# Patient Record
Sex: FEMALE | Race: WHITE | Employment: FULL TIME | URBAN - METROPOLITAN AREA
[De-identification: names, ages, dates, MRNs, and addresses within clinical notes are randomized per-mention and may not be internally consistent; named-entity substitution may affect disease eponyms.]

---

## 2017-01-10 ENCOUNTER — OFFICE VISIT (OUTPATIENT)
Dept: GASTROENTEROLOGY | Age: 62
End: 2017-01-10

## 2017-01-10 ENCOUNTER — TELEPHONE (OUTPATIENT)
Dept: GASTROENTEROLOGY | Age: 62
End: 2017-01-10

## 2017-01-10 VITALS
OXYGEN SATURATION: 97 % | SYSTOLIC BLOOD PRESSURE: 122 MMHG | HEIGHT: 64 IN | DIASTOLIC BLOOD PRESSURE: 86 MMHG | HEART RATE: 65 BPM | WEIGHT: 204 LBS | BODY MASS INDEX: 34.83 KG/M2

## 2017-01-10 DIAGNOSIS — K59.00 CONSTIPATION, UNSPECIFIED CONSTIPATION TYPE: ICD-10-CM

## 2017-01-10 DIAGNOSIS — R11.0 NAUSEA: ICD-10-CM

## 2017-01-10 DIAGNOSIS — R79.89 ELEVATED LFTS: ICD-10-CM

## 2017-01-10 DIAGNOSIS — K74.3 PRIMARY BILIARY CIRRHOSIS (HCC): Primary | ICD-10-CM

## 2017-01-10 DIAGNOSIS — R13.14 PHARYNGOESOPHAGEAL DYSPHAGIA: ICD-10-CM

## 2017-01-10 PROCEDURE — 99213 OFFICE O/P EST LOW 20 MIN: CPT | Performed by: INTERNAL MEDICINE

## 2017-01-10 ASSESSMENT — ENCOUNTER SYMPTOMS
RESPIRATORY NEGATIVE: 1
CONSTIPATION: 1
EYES NEGATIVE: 1
ALLERGIC/IMMUNOLOGIC NEGATIVE: 1
NAUSEA: 1

## 2017-03-22 ENCOUNTER — TELEPHONE (OUTPATIENT)
Dept: GASTROENTEROLOGY | Age: 62
End: 2017-03-22

## 2017-04-12 ENCOUNTER — TELEPHONE (OUTPATIENT)
Dept: GASTROENTEROLOGY | Age: 62
End: 2017-04-12

## 2017-04-12 ENCOUNTER — OFFICE VISIT (OUTPATIENT)
Dept: GASTROENTEROLOGY | Age: 62
End: 2017-04-12

## 2017-04-12 VITALS
WEIGHT: 199 LBS | BODY MASS INDEX: 35.26 KG/M2 | DIASTOLIC BLOOD PRESSURE: 86 MMHG | SYSTOLIC BLOOD PRESSURE: 118 MMHG | HEART RATE: 65 BPM | OXYGEN SATURATION: 96 % | HEIGHT: 63 IN

## 2017-04-12 DIAGNOSIS — R11.0 NAUSEA: ICD-10-CM

## 2017-04-12 DIAGNOSIS — R13.14 PHARYNGOESOPHAGEAL DYSPHAGIA: ICD-10-CM

## 2017-04-12 DIAGNOSIS — R79.89 ELEVATED LFTS: ICD-10-CM

## 2017-04-12 DIAGNOSIS — K74.3 PRIMARY BILIARY CIRRHOSIS (HCC): Primary | ICD-10-CM

## 2017-04-12 DIAGNOSIS — R19.7 DIARRHEA, UNSPECIFIED TYPE: ICD-10-CM

## 2017-04-12 PROCEDURE — 99213 OFFICE O/P EST LOW 20 MIN: CPT | Performed by: INTERNAL MEDICINE

## 2017-04-12 RX ORDER — ERGOCALCIFEROL 1.25 MG/1
CAPSULE ORAL
Refills: 3 | COMMUNITY
Start: 2017-04-04 | End: 2017-11-15

## 2017-04-12 ASSESSMENT — ENCOUNTER SYMPTOMS
ALLERGIC/IMMUNOLOGIC NEGATIVE: 1
CONSTIPATION: 1
RESPIRATORY NEGATIVE: 1
EYES NEGATIVE: 1
DIARRHEA: 1
NAUSEA: 1

## 2017-04-21 ENCOUNTER — HOSPITAL ENCOUNTER (OUTPATIENT)
Dept: GENERAL RADIOLOGY | Age: 62
Discharge: HOME | End: 2017-04-21
Attending: INTERNAL MEDICINE | Admitting: INTERNAL MEDICINE

## 2017-04-21 LAB
ALBUMIN SERPL-MCNC: 4.4 GM/DL (ref 3.4–5)
ALP BLD-CCNC: 193 IU/L (ref 40–129)
ALT SERPL-CCNC: 44 U/L (ref 10–40)
AST SERPL-CCNC: 48 IU/L (ref 15–37)
BILIRUB SERPL-MCNC: 1 MG/DL (ref 0–1)
BILIRUBIN DIRECT: 0.2 MG/DL (ref 0–0.3)
BILIRUBIN, INDIRECT: 0.8 MG/DL (ref 0–0.7)
TOTAL PROTEIN: 7.1 GM/DL (ref 6.4–8.2)

## 2017-04-25 DIAGNOSIS — K74.5 BILIARY CIRRHOSIS (HCC): ICD-10-CM

## 2017-04-25 DIAGNOSIS — R79.89 ELEVATED LFTS: Primary | ICD-10-CM

## 2017-05-10 ENCOUNTER — HOSPITAL ENCOUNTER (OUTPATIENT)
Dept: GENERAL RADIOLOGY | Age: 62
Discharge: HOME | End: 2017-05-10
Attending: INTERNAL MEDICINE | Admitting: INTERNAL MEDICINE

## 2017-05-10 ENCOUNTER — HOSPITAL ENCOUNTER (OUTPATIENT)
Dept: MRI IMAGING | Age: 62
Discharge: HOME | End: 2017-05-10

## 2017-05-10 DIAGNOSIS — G43.909 MIGRAINE WITHOUT STATUS MIGRAINOSUS, NOT INTRACTABLE: ICD-10-CM

## 2017-05-10 DIAGNOSIS — G43.809 OTHER MIGRAINE WITHOUT STATUS MIGRAINOSUS, NOT INTRACTABLE: ICD-10-CM

## 2017-05-10 LAB
ALBUMIN SERPL-MCNC: 4.3 GM/DL (ref 3.4–5)
ALP BLD-CCNC: 186 IU/L (ref 40–129)
ALT SERPL-CCNC: 21 U/L (ref 10–40)
AST SERPL-CCNC: 23 IU/L (ref 15–37)
BILIRUB SERPL-MCNC: 1.3 MG/DL (ref 0–1)
BILIRUBIN DIRECT: 0.2 MG/DL (ref 0–0.3)
BILIRUBIN, INDIRECT: 1.1 MG/DL (ref 0–0.7)
MAGNESIUM: 2.3 MG/DL (ref 1.8–2.4)
TOTAL PROTEIN: 6.8 GM/DL (ref 6.4–8.2)
VITAMIN B-12: 408.3 PG/ML (ref 211–911)
VITAMIN D 25-HYDROXY: 32.02 NG/ML

## 2017-05-11 ENCOUNTER — TELEPHONE (OUTPATIENT)
Dept: GASTROENTEROLOGY | Age: 62
End: 2017-05-11

## 2017-05-11 DIAGNOSIS — R79.89 ELEVATED LFTS: Primary | ICD-10-CM

## 2017-05-12 LAB — ZINC: 75

## 2017-05-13 LAB
METHYLMALONIC ACID: 0.14
VITAMIN B2: 22

## 2017-05-15 LAB — VITAMIN B6: 17.4

## 2017-05-20 ENCOUNTER — HOSPITAL ENCOUNTER (OUTPATIENT)
Dept: LAB | Age: 62
Discharge: HOME | End: 2017-05-20
Attending: PSYCHIATRY & NEUROLOGY | Admitting: PSYCHIATRY & NEUROLOGY

## 2017-05-25 LAB — VITAMIN B1, PLASMA: 66

## 2017-06-02 ENCOUNTER — HOSPITAL ENCOUNTER (OUTPATIENT)
Dept: GENERAL RADIOLOGY | Age: 62
Discharge: HOME | End: 2017-06-02
Attending: PSYCHIATRY & NEUROLOGY | Admitting: PSYCHIATRY & NEUROLOGY

## 2017-06-02 LAB
CHOLESTEROL: 207 MG/DL
ERYTHROCYTE SEDIMENTATION RATE: 12 MM/HR (ref 0–30)
ESTIMATED AVERAGE GLUCOSE: 108 MG/DL
HBA1C MFR BLD: 5.4 % (ref 4.2–6.3)
HDLC SERPL-MCNC: 46 MG/DL
IMMUNOFIXATION ELECTROPHORESIS 1: NORMAL
LDL CHOLESTEROL DIRECT: 145 MG/DL
T4 FREE: 1.32 NG/DL (ref 0.9–1.8)
TRIGL SERPL-MCNC: 113 MG/DL
TSH HIGH SENSITIVITY: 2.43 UIU/ML (ref 0.27–4.2)

## 2017-06-04 LAB
ANTI-NUCLEAR ANTIBODY (ANA): NORMAL
COPPER: 128
KAPPA QUANT FREE LIGHT CHAINS: 2.05
KAPPA/LAMBDA FREE LIGHT CHAIN RATIO: 1.27
LAMBDA FREE LIGHT CHAINS URINE/ VOL: 1.61

## 2017-06-05 LAB
ALBUMIN ELP: 3.7 GM/DL (ref 3.2–5.6)
ALPHA-1-GLOBULIN: 0.3 GM/DL (ref 0.1–0.4)
ALPHA-2-GLOBULIN: 0.8 GM/DL (ref 0.4–1.2)
BETA GLOBULIN: 1.1 GM/DL (ref 0.5–1.3)
GAMMA GLOBULIN: 1.1 GM/DL (ref 0.5–1.6)
TOTAL PROTEIN: 7 GM/DL (ref 6.4–8.2)
VITAMIN E LEVEL: 1.5
VITAMIN E LEVEL: 7.6

## 2017-06-06 ENCOUNTER — HOSPITAL ENCOUNTER (OUTPATIENT)
Dept: GENERAL RADIOLOGY | Age: 62
Discharge: HOME | End: 2017-06-06
Attending: INTERNAL MEDICINE | Admitting: INTERNAL MEDICINE

## 2017-06-06 LAB
ALBUMIN SERPL-MCNC: 4.4 GM/DL (ref 3.4–5)
ALP BLD-CCNC: 192 IU/L (ref 40–129)
ALT SERPL-CCNC: 14 U/L (ref 10–40)
AST SERPL-CCNC: 19 IU/L (ref 15–37)
BILIRUB SERPL-MCNC: 1 MG/DL (ref 0–1)
BILIRUBIN DIRECT: 0.2 MG/DL (ref 0–0.3)
BILIRUBIN, INDIRECT: 0.8 MG/DL (ref 0–0.7)
Lab: NORMAL
TEST NAME: NORMAL
TOTAL PROTEIN: 7.2 GM/DL (ref 6.4–8.2)

## 2017-07-18 ENCOUNTER — HOSPITAL ENCOUNTER (OUTPATIENT)
Dept: ULTRASOUND IMAGING | Age: 62
Discharge: HOME | End: 2017-07-18
Attending: NURSE PRACTITIONER | Admitting: NURSE PRACTITIONER

## 2017-07-18 DIAGNOSIS — K74.60 CIRRHOSIS OF LIVER WITHOUT ASCITES, UNSPECIFIED HEPATIC CIRRHOSIS TYPE (HCC): ICD-10-CM

## 2017-07-18 DIAGNOSIS — K74.3 PRIMARY BILIARY CIRRHOSIS (HCC): ICD-10-CM

## 2017-10-05 ENCOUNTER — HOSPITAL ENCOUNTER (OUTPATIENT)
Dept: GENERAL RADIOLOGY | Age: 62
Discharge: HOME | End: 2017-10-05
Attending: NURSE PRACTITIONER | Admitting: NURSE PRACTITIONER

## 2017-10-05 LAB
ALBUMIN SERPL-MCNC: 4.5 GM/DL (ref 3.4–5)
ALP BLD-CCNC: 160 IU/L (ref 40–129)
ALT SERPL-CCNC: 16 U/L (ref 10–40)
ANION GAP SERPL CALCULATED.3IONS-SCNC: 14 MMOL/L (ref 4–16)
AST SERPL-CCNC: 20 IU/L (ref 15–37)
BILIRUB SERPL-MCNC: 1.3 MG/DL (ref 0–1)
BILIRUBIN DIRECT: 0.2 MG/DL (ref 0–0.3)
BILIRUBIN, INDIRECT: 1.1 MG/DL (ref 0–0.7)
BUN BLDV-MCNC: 21 MG/DL (ref 6–23)
CALCIUM SERPL-MCNC: 9.3 MG/DL (ref 8.3–10.6)
CHLORIDE BLD-SCNC: 101 MMOL/L (ref 99–110)
CO2: 25 MMOL/L (ref 21–32)
CREAT SERPL-MCNC: 0.9 MG/DL (ref 0.6–1.1)
GFR AFRICAN AMERICAN: >60 ML/MIN/1.73M2
GFR NON-AFRICAN AMERICAN: >60 ML/MIN/1.73M2
GLUCOSE BLD-MCNC: 89 MG/DL (ref 70–140)
INR BLD: 0.96 INDEX
POTASSIUM SERPL-SCNC: 4.1 MMOL/L (ref 3.5–5.1)
PROTHROMBIN TIME: 10.9 SECONDS (ref 9.12–12.5)
SODIUM BLD-SCNC: 140 MMOL/L (ref 135–145)
TOTAL PROTEIN: 7 GM/DL (ref 6.4–8.2)

## 2017-10-06 LAB — MS ALPHA-FETOPROTEIN: 4

## 2017-10-26 ENCOUNTER — HOSPITAL ENCOUNTER (OUTPATIENT)
Dept: GENERAL RADIOLOGY | Age: 62
Discharge: HOME | End: 2017-10-26
Attending: GENERAL PRACTICE | Admitting: GENERAL PRACTICE

## 2017-10-26 DIAGNOSIS — G43.909 MIGRAINE WITHOUT STATUS MIGRAINOSUS, NOT INTRACTABLE, UNSPECIFIED MIGRAINE TYPE: ICD-10-CM

## 2017-11-30 ENCOUNTER — HOSPITAL ENCOUNTER (OUTPATIENT)
Dept: WOMENS IMAGING | Age: 62
Discharge: HOME | End: 2017-12-29
Attending: GENERAL PRACTICE | Admitting: GENERAL PRACTICE

## 2017-12-08 ENCOUNTER — NURSE ONLY (OUTPATIENT)
Dept: CARDIOLOGY CLINIC | Age: 62
End: 2017-12-08

## 2017-12-08 ENCOUNTER — OFFICE VISIT (OUTPATIENT)
Dept: CARDIOLOGY CLINIC | Age: 62
End: 2017-12-08

## 2017-12-08 VITALS
BODY MASS INDEX: 34.2 KG/M2 | SYSTOLIC BLOOD PRESSURE: 122 MMHG | DIASTOLIC BLOOD PRESSURE: 80 MMHG | WEIGHT: 193 LBS | HEART RATE: 70 BPM | HEIGHT: 63 IN

## 2017-12-08 DIAGNOSIS — R07.89 OTHER CHEST PAIN: Primary | ICD-10-CM

## 2017-12-08 DIAGNOSIS — I10 ESSENTIAL HYPERTENSION: ICD-10-CM

## 2017-12-08 PROCEDURE — 1111F DSCHRG MED/CURRENT MED MERGE: CPT | Performed by: INTERNAL MEDICINE

## 2017-12-08 PROCEDURE — 3017F COLORECTAL CA SCREEN DOC REV: CPT | Performed by: INTERNAL MEDICINE

## 2017-12-08 PROCEDURE — 3014F SCREEN MAMMO DOC REV: CPT | Performed by: INTERNAL MEDICINE

## 2017-12-08 PROCEDURE — 93225 XTRNL ECG REC<48 HRS REC: CPT | Performed by: INTERNAL MEDICINE

## 2017-12-08 PROCEDURE — 1036F TOBACCO NON-USER: CPT | Performed by: INTERNAL MEDICINE

## 2017-12-08 PROCEDURE — 99214 OFFICE O/P EST MOD 30 MIN: CPT | Performed by: INTERNAL MEDICINE

## 2017-12-08 PROCEDURE — G8417 CALC BMI ABV UP PARAM F/U: HCPCS | Performed by: INTERNAL MEDICINE

## 2017-12-08 PROCEDURE — G8484 FLU IMMUNIZE NO ADMIN: HCPCS | Performed by: INTERNAL MEDICINE

## 2017-12-08 PROCEDURE — G8427 DOCREV CUR MEDS BY ELIG CLIN: HCPCS | Performed by: INTERNAL MEDICINE

## 2017-12-08 RX ORDER — LISINOPRIL 10 MG/1
10 TABLET ORAL DAILY
Qty: 30 TABLET | Refills: 3 | Status: SHIPPED | OUTPATIENT
Start: 2017-12-08

## 2017-12-08 NOTE — PROGRESS NOTES
Lelia Perez MD        OFFICE  FOLLOWUP NOTE    Chief complaints: patient is here for management of chest pain, HTN and palpitations,GERD  Subjective: patient feels better, no chest pain, no shortness of breath, no dizziness, no palpitations    ARMIDA Avendaño is a 60 y. o.year old who  has a past medical history of Bilateral arm weakness; Chest pain; Chronic kidney disease; Gallstones; GERD (gastroesophageal reflux disease); History of exercise stress test; Hypertension; Jaundice; Lupus; Migraines; Nausea & vomiting; Other disorders of kidney and ureter in diseases classified elsewhere; PONV (postoperative nausea and vomiting); Prediabetes; Primary biliary cirrhosis; Prolonged emergence from general anesthesia; and Vertigo. and presents for management of chest pain, HTN and palpitations,GERD which are well controlled, her chest pain is early morning, ( chest pain for last few years, happening daily, intermittent for 15 to 20 mins and aggravated with activity substernal also,reproducible with palpation, radiated to shoulder, 6/10, tender to touch,associated with shortness of breath, + sweating, nausea, did not get NTG in ED). She was seen in the ED for chest pressure and uncontrolled HTN, she also had palpitations, she was discharged on lisinopril, she has almost daily  palpitations its intermittent, severe in intensity, pounding like duration is for 2 hrs, happens while sitting,not associated with shortness of breath, chest pain although has some dizziness.           Current Outpatient Prescriptions   Medication Sig Dispense Refill    lisinopril (PRINIVIL;ZESTRIL) 10 MG tablet Take 1 tablet by mouth daily 30 tablet 3    Cholecalciferol (VITAMIN D3) 30970 units CAPS Take 50,000 Units by mouth once a week 11/15/17 Patient states she takes this on Saturdays      ondansetron (ZOFRAN) 4 MG tablet Take 1 tablet by mouth daily as needed for Nausea or Vomiting 60 tablet 2    SUMAtriptan (IMITREX) 50 MG tablet Take 50 mg by mouth      ursodiol (ACTIGALL) Take 600 mg by mouth 2 times daily       ranitidine (ZANTAC) 300 MG tablet Take 1 tablet by mouth nightly 30 tablet 3    meclizine (ANTIVERT) 12.5 MG tablet Take 25 mg by mouth 3 times daily as needed. No current facility-administered medications for this visit.       Allergies: Antihistamines, diphenhydramine-type; Codeine; and Lyrica [pregabalin]  Past Medical History:   Diagnosis Date    Bilateral arm weakness 11/15/2016    Chest pain 11/15/2016    Chronic kidney disease     Gallstones     \"have gallstones, dx \"    GERD (gastroesophageal reflux disease)     History of exercise stress test 2016    treadmill    Hypertension     Jaundice     \" when I had mono I was jaundice\"    Lupus     \"was told years ago that I had lupus then two years later they said had linda barr virus and fibromyalgia , not lupus so I dont know if I have it or not\"    Migraines     Nausea & vomiting     post op n&v, hx motion sickness    Other disorders of kidney and ureter in diseases classified elsewhere     PONV (postoperative nausea and vomiting)     Prediabetes     Primary biliary cirrhosis     Prolonged emergence from general anesthesia     Vertigo      Past Surgical History:   Procedure Laterality Date    ABDOMEN SURGERY      APPENDECTOMY      BREAST SURGERY      left breast bx     BREAST SURGERY Left     cyst removal     SECTION      \"and also tied my tubes\"    CHOLECYSTECTOMY      COLONOSCOPY  16    Internal hemorrhoids    DILATION AND CURETTAGE OF UTERUS      as a teenager    ENDOSCOPY, COLON, DIAGNOSTIC  16    mild gastritis, hiatal hernia, balloon dilitation up to 20mm    HYSTERECTOMY  1986    nichelle\"left both ovaries\"     Family History   Problem Relation Age of Onset    Heart Disease Father      cabg    Stroke Father     Cancer Father     Kidney Disease Father     High Blood Pressure Mother     Stroke Mother     Asthma Mother    Chyrl Thornton COPD Mother     Depression Mother     Other Mother      Suicide   Chyrl Thornton Migraines Mother     Thyroid Disease Mother     Cancer Paternal Aunt      colon    Cancer Other      Grandfather   Chyrl Thornton Migraines Sister     Other Other      Gout-Grandmother     Social History   Substance Use Topics    Smoking status: Former Smoker     Packs/day: 1.00     Years: 6.00     Quit date: 4/20/1978    Smokeless tobacco: Never Used    Alcohol use No      [unfilled]  Review of Systems:   · Constitutional: No Fever or Weight Loss   · Eyes: No Decreased Vision  · ENT: No Headaches, Hearing Loss or Vertigo  · Cardiovascular: + chest pain, dyspnea on exertion, +palpitations or loss of consciousness  · Respiratory: No cough or wheezing    · Gastrointestinal: No abdominal pain, appetite loss, blood in stools, constipation, diarrhea or heartburn  · Genitourinary: No dysuria, trouble voiding, or hematuria  · Musculoskeletal:  No gait disturbance, weakness or joint complaints  · Integumentary: No rash or pruritis  · Neurological: No TIA or stroke symptoms  · Psychiatric: No anxiety or depression  · Endocrine: No malaise, fatigue or temperature intolerance  · Hematologic/Lymphatic: No bleeding problems, blood clots or swollen lymph nodes  · Allergic/Immunologic: No nasal congestion or hives  All systems negative except as marked. Objective:  /80   Pulse 70   Ht 5' 3\" (1.6 m)   Wt 193 lb (87.5 kg)   BMI 34.19 kg/m²   Wt Readings from Last 3 Encounters:   12/08/17 193 lb (87.5 kg)   11/17/17 188 lb 8 oz (85.5 kg)   04/12/17 199 lb (90.3 kg)     Body mass index is 34.19 kg/m².   GENERAL - Alert, oriented, pleasant, in no apparent distress,normal grooming  HEENT  pupils are reactive to light and accomodation, cornea intact, conjunctive normal color, ears are normal in exam,throat exam in normal, teeth, gum and palate are normal, oral mucosa is normal without any notation of pallor or cyanosis  Neck -

## 2017-12-12 ENCOUNTER — TELEPHONE (OUTPATIENT)
Dept: CARDIOLOGY CLINIC | Age: 62
End: 2017-12-12

## 2017-12-12 NOTE — TELEPHONE ENCOUNTER
Called patient to let her know of pending authorization for stress test scheduled 12/13/17. Will call patient to reschedule once approval received.

## 2017-12-15 PROCEDURE — 93227 XTRNL ECG REC<48 HR R&I: CPT | Performed by: INTERNAL MEDICINE

## 2017-12-18 ENCOUNTER — TELEPHONE (OUTPATIENT)
Dept: CARDIOLOGY CLINIC | Age: 62
End: 2017-12-18

## 2017-12-18 ENCOUNTER — HOSPITAL ENCOUNTER (OUTPATIENT)
Dept: WOMENS IMAGING | Age: 62
Discharge: HOME | End: 2017-12-18
Attending: GENERAL PRACTICE | Admitting: GENERAL PRACTICE

## 2017-12-18 DIAGNOSIS — Z12.31 VISIT FOR SCREENING MAMMOGRAM: ICD-10-CM

## 2017-12-18 NOTE — TELEPHONE ENCOUNTER
patient called gave above message.  patient said we can call her work or cell number both are fine to leave message on

## 2017-12-18 NOTE — TELEPHONE ENCOUNTER
Called pt's cell phone number and it was work #. Need to verify which # we should call her at. LM for her to call back. Results of holter monitor. A few episodes of faster heartbeats, nothing significant.

## 2017-12-26 ENCOUNTER — HOSPITAL ENCOUNTER (OUTPATIENT)
Dept: ULTRASOUND IMAGING | Age: 62
Discharge: HOME | End: 2017-12-26
Attending: GENERAL PRACTICE | Admitting: GENERAL PRACTICE

## 2017-12-26 DIAGNOSIS — R92.8 ABNORMAL MAMMOGRAM: ICD-10-CM

## 2017-12-26 DIAGNOSIS — N64.89 BREAST ASYMMETRY: ICD-10-CM

## 2018-02-08 ENCOUNTER — TELEPHONE (OUTPATIENT)
Dept: CARDIOLOGY CLINIC | Age: 63
End: 2018-02-08

## 2018-02-08 NOTE — TELEPHONE ENCOUNTER
Patient was seen here in our office and scheduled for a stress test for 12/13/2017. Patient was called on 13/04/40 by [address] [place] [address] specialist and told it was pending and she would be called back to be rescheduled once it was approved. Pt never heard anything back from anyone. Pt now has a new insurance.  [employer]   Member ID# [id number]  Group # [id number]    Scheduling pt for her stress test.

## 2018-03-12 ENCOUNTER — OFFICE VISIT (OUTPATIENT)
Dept: FAMILY MEDICINE CLINIC | Age: 63
End: 2018-03-12

## 2018-03-12 VITALS
TEMPERATURE: 97.1 F | HEIGHT: 63 IN | SYSTOLIC BLOOD PRESSURE: 132 MMHG | DIASTOLIC BLOOD PRESSURE: 84 MMHG | BODY MASS INDEX: 33.88 KG/M2 | HEART RATE: 66 BPM | WEIGHT: 191.2 LBS | OXYGEN SATURATION: 98 %

## 2018-03-12 DIAGNOSIS — K74.5 BILIARY CIRRHOSIS (HCC): ICD-10-CM

## 2018-03-12 DIAGNOSIS — Z86.39 HISTORY OF NON ANEMIC VITAMIN B12 DEFICIENCY: ICD-10-CM

## 2018-03-12 DIAGNOSIS — Z76.89 ESTABLISHING CARE WITH NEW DOCTOR, ENCOUNTER FOR: ICD-10-CM

## 2018-03-12 DIAGNOSIS — R11.0 CHRONIC NAUSEA: ICD-10-CM

## 2018-03-12 DIAGNOSIS — R20.0 NUMBNESS IN LEFT LEG: ICD-10-CM

## 2018-03-12 DIAGNOSIS — R42 DIZZINESS: ICD-10-CM

## 2018-03-12 DIAGNOSIS — R29.898 BILATERAL ARM WEAKNESS: ICD-10-CM

## 2018-03-12 DIAGNOSIS — E55.9 VITAMIN D DEFICIENCY: ICD-10-CM

## 2018-03-12 DIAGNOSIS — I10 ESSENTIAL HYPERTENSION: Primary | ICD-10-CM

## 2018-03-12 DIAGNOSIS — R21 RASH OF FACE: ICD-10-CM

## 2018-03-12 DIAGNOSIS — E53.1 VITAMIN B6 DEFICIENCY: ICD-10-CM

## 2018-03-12 DIAGNOSIS — Z72.820 POOR SLEEP: ICD-10-CM

## 2018-03-12 DIAGNOSIS — R00.2 PALPITATIONS: ICD-10-CM

## 2018-03-12 PROCEDURE — 99204 OFFICE O/P NEW MOD 45 MIN: CPT | Performed by: FAMILY MEDICINE

## 2018-03-12 RX ORDER — URSODIOL 300 MG/1
CAPSULE ORAL
Refills: 5 | COMMUNITY
Start: 2018-03-07

## 2018-03-12 ASSESSMENT — ENCOUNTER SYMPTOMS
ABDOMINAL PAIN: 1
DIARRHEA: 1
VOMITING: 0
CONSTIPATION: 1
SHORTNESS OF BREATH: 0
COUGH: 1

## 2018-03-12 ASSESSMENT — PATIENT HEALTH QUESTIONNAIRE - PHQ9
SUM OF ALL RESPONSES TO PHQ QUESTIONS 1-9: 0
2. FEELING DOWN, DEPRESSED OR HOPELESS: 0
1. LITTLE INTEREST OR PLEASURE IN DOING THINGS: 0
SUM OF ALL RESPONSES TO PHQ9 QUESTIONS 1 & 2: 0

## 2018-03-12 NOTE — PROGRESS NOTES
Outpatient Prescriptions:     ursodiol (ACTIGALL) 300 MG capsule, TK 2 CS PO BID, Disp: , Rfl: 5    lisinopril (PRINIVIL;ZESTRIL) 10 MG tablet, Take 1 tablet by mouth daily, Disp: 30 tablet, Rfl: 3    Cholecalciferol (VITAMIN D3) 71299 units CAPS, Take 50,000 Units by mouth once a week 11/15/17 Patient states she takes this on Saturdays, Disp: , Rfl:     ondansetron (ZOFRAN) 4 MG tablet, Take 1 tablet by mouth daily as needed for Nausea or Vomiting, Disp: 60 tablet, Rfl: 2    SUMAtriptan (IMITREX) 50 MG tablet, Take 50 mg by mouth, Disp: , Rfl:     ranitidine (ZANTAC) 300 MG tablet, Take 1 tablet by mouth nightly, Disp: 30 tablet, Rfl: 3    meclizine (ANTIVERT) 12.5 MG tablet, Take 25 mg by mouth 3 times daily as needed. , Disp: , Rfl:       Objective:   Physical Exam   Constitutional: She is oriented to person, place, and time. She appears well-developed and well-nourished. No distress. HENT:   Head: Normocephalic and atraumatic. Right Ear: External ear normal.   Left Ear: External ear normal.   Mouth/Throat: Oropharynx is clear and moist.   Eyes: Conjunctivae and EOM are normal. Pupils are equal, round, and reactive to light. Neck: Normal range of motion. Neck supple. No thyromegaly present. Cardiovascular: Normal rate and regular rhythm. No murmur heard. Pulmonary/Chest: Effort normal and breath sounds normal. She has no wheezes. She has no rales. Abdominal: Soft. Bowel sounds are normal. She exhibits no mass. There is no tenderness. Musculoskeletal: She exhibits no edema. Strength testing for arms and shoulder girdles bilaterally are equal and within normal limits. No appreciative weakness noted. After testing she did rub her upper arms more on the right than the left. Lymphadenopathy:     She has no cervical adenopathy. Neurological: She is alert and oriented to person, place, and time. She has normal reflexes. No cranial nerve deficit. Coordination normal.   Skin: No rash noted. rash that affects her cheeks and blisters looks like sunburn incineration for lupus or other autoimmune disorder must be given. She does have some tightness of the skin of her face which is a little concerning for other autoimmune disorders. Sed rate has never been elevated. We'll get old records delve into this further. 11. She reports having EMGs done and the neurologist told her she has neuropathy of the leg. That would explain the numbness and pain in her lower leg but does not explain the weakness that she claims of the left leg. We'll get records and reevaluate what's been done. With this neural logical issue along with several other potential neurological issues follow-up with neurology makes very good sense. 12. She has chronic poor sleep habits. I don't know how much of this will be able to adjust but will visit after I get through all of her records and start working on some of her issues. 13.  Visit to establish care. Follow-up after review of records and labs. No longer than 6 months. If no records available by that time will start from scratch.

## 2018-03-23 ENCOUNTER — TELEPHONE (OUTPATIENT)
Dept: FAMILY MEDICINE CLINIC | Age: 63
End: 2018-03-23

## 2018-04-17 ENCOUNTER — HOSPITAL ENCOUNTER (OUTPATIENT)
Dept: CT IMAGING | Age: 63
Discharge: HOME | End: 2018-04-17
Attending: NURSE PRACTITIONER | Admitting: NURSE PRACTITIONER

## 2018-04-17 DIAGNOSIS — K74.3 PRIMARY BILIARY CHOLANGITIS (HCC): ICD-10-CM

## 2018-04-17 DIAGNOSIS — K74.3 HEPATIC CIRRHOSIS DUE TO PRIMARY BILIARY CHOLANGITIS (HCC): ICD-10-CM

## 2018-04-17 DIAGNOSIS — R10.33 PERIUMBILICAL ABDOMINAL PAIN: ICD-10-CM

## 2018-05-01 ENCOUNTER — OFFICE VISIT (OUTPATIENT)
Dept: GASTROENTEROLOGY | Age: 63
End: 2018-05-01

## 2018-05-01 VITALS
BODY MASS INDEX: 34.91 KG/M2 | HEIGHT: 63 IN | SYSTOLIC BLOOD PRESSURE: 112 MMHG | WEIGHT: 197 LBS | HEART RATE: 70 BPM | DIASTOLIC BLOOD PRESSURE: 74 MMHG | OXYGEN SATURATION: 99 %

## 2018-05-01 DIAGNOSIS — Z98.890 HISTORY OF ESOPHAGEAL DILATATION: ICD-10-CM

## 2018-05-01 DIAGNOSIS — R13.14 PHARYNGOESOPHAGEAL DYSPHAGIA: Primary | ICD-10-CM

## 2018-05-01 DIAGNOSIS — R11.0 NAUSEA: ICD-10-CM

## 2018-05-01 DIAGNOSIS — K21.9 GASTROESOPHAGEAL REFLUX DISEASE WITHOUT ESOPHAGITIS: ICD-10-CM

## 2018-05-01 PROCEDURE — 99215 OFFICE O/P EST HI 40 MIN: CPT | Performed by: NURSE PRACTITIONER

## 2018-05-01 RX ORDER — LISINOPRIL 10 MG/1
10 TABLET ORAL
COMMUNITY
End: 2018-05-01 | Stop reason: CLARIF

## 2018-05-01 RX ORDER — POLYETHYLENE GLYCOL 3350 17 G/17G
17 POWDER, FOR SOLUTION ORAL DAILY
Qty: 510 G | Refills: 0 | Status: SHIPPED | OUTPATIENT
Start: 2018-05-01 | End: 2018-05-31

## 2018-05-01 RX ORDER — DICYCLOMINE HYDROCHLORIDE 10 MG/1
10 CAPSULE ORAL
COMMUNITY

## 2018-05-01 ASSESSMENT — ENCOUNTER SYMPTOMS
CONSTIPATION: 1
BLOOD IN STOOL: 0
ABDOMINAL PAIN: 1
SPUTUM PRODUCTION: 0
SHORTNESS OF BREATH: 0
NAUSEA: 1
VOMITING: 0
DIARRHEA: 1
DOUBLE VISION: 0
COUGH: 0
HEARTBURN: 1
WHEEZING: 0
BLURRED VISION: 1
BACK PAIN: 1
EYE PAIN: 0

## 2018-05-08 ENCOUNTER — HOSPITAL ENCOUNTER (OUTPATIENT)
Dept: GENERAL RADIOLOGY | Age: 63
Discharge: HOME | End: 2018-05-08
Attending: NURSE PRACTITIONER | Admitting: NURSE PRACTITIONER

## 2018-05-08 LAB
ALBUMIN SERPL-MCNC: 4.4 GM/DL (ref 3.4–5)
ALP BLD-CCNC: 174 IU/L (ref 40–129)
ALT SERPL-CCNC: 16 U/L (ref 10–40)
ANION GAP SERPL CALCULATED.3IONS-SCNC: 12 MMOL/L (ref 4–16)
AST SERPL-CCNC: 21 IU/L (ref 15–37)
BILIRUB SERPL-MCNC: 0.6 MG/DL (ref 0–1)
BILIRUBIN DIRECT: 0.2 MG/DL (ref 0–0.3)
BILIRUBIN, INDIRECT: 0.4 MG/DL (ref 0–0.7)
BUN BLDV-MCNC: 20 MG/DL (ref 6–23)
CALCIUM SERPL-MCNC: 9.5 MG/DL (ref 8.3–10.6)
CHLORIDE BLD-SCNC: 105 MMOL/L (ref 99–110)
CO2: 26 MMOL/L (ref 21–32)
CREAT SERPL-MCNC: 0.9 MG/DL (ref 0.6–1.1)
GFR AFRICAN AMERICAN: >60 ML/MIN/1.73M2
GFR NON-AFRICAN AMERICAN: >60 ML/MIN/1.73M2
GLUCOSE BLD-MCNC: 88 MG/DL (ref 70–99)
INR BLD: 1 INDEX
POTASSIUM SERPL-SCNC: 5.1 MMOL/L (ref 3.5–5.1)
PROTHROMBIN TIME: 11.6 SECONDS
SODIUM BLD-SCNC: 143 MMOL/L (ref 135–145)
TOTAL PROTEIN: 6.9 GM/DL (ref 6.4–8.2)

## 2018-07-09 DIAGNOSIS — I10 ESSENTIAL HYPERTENSION: ICD-10-CM

## 2018-07-09 DIAGNOSIS — R07.89 OTHER CHEST PAIN: ICD-10-CM

## 2018-07-09 RX ORDER — LISINOPRIL 10 MG/1
10 TABLET ORAL DAILY
Qty: 30 TABLET | Refills: 3 | Status: CANCELLED | OUTPATIENT
Start: 2018-07-09

## 2018-07-09 RX ORDER — SODIUM CHLORIDE, SODIUM LACTATE, POTASSIUM CHLORIDE, CALCIUM CHLORIDE 600; 310; 30; 20 MG/100ML; MG/100ML; MG/100ML; MG/100ML
INJECTION, SOLUTION INTRAVENOUS CONTINUOUS
Status: CANCELLED | OUTPATIENT
Start: 2018-07-09

## 2018-07-16 ENCOUNTER — TELEPHONE (OUTPATIENT)
Dept: GASTROENTEROLOGY | Age: 63
End: 2018-07-16

## 2018-07-20 ENCOUNTER — HOSPITAL ENCOUNTER (OUTPATIENT)
Dept: SURGERY | Age: 63
Discharge: HOME | End: 2018-08-18
Attending: INTERNAL MEDICINE | Admitting: INTERNAL MEDICINE

## 2018-08-02 ENCOUNTER — TELEPHONE (OUTPATIENT)
Dept: GASTROENTEROLOGY | Age: 63
End: 2018-08-02

## 2018-08-06 ENCOUNTER — TELEPHONE (OUTPATIENT)
Dept: GASTROENTEROLOGY | Age: 63
End: 2018-08-06

## 2018-08-06 NOTE — TELEPHONE ENCOUNTER
Patient has a chronic condition that has resulted in chronic diarrhea and would need frequent bathroom breaks. The rest is good. Thank you.

## 2018-08-13 ENCOUNTER — HOSPITAL ENCOUNTER (OUTPATIENT)
Dept: GENERAL RADIOLOGY | Age: 63
Discharge: HOME | End: 2018-08-13
Attending: INTERNAL MEDICINE | Admitting: INTERNAL MEDICINE

## 2018-08-13 LAB
ALBUMIN SERPL-MCNC: 4.4 GM/DL (ref 3.4–5)
ALP BLD-CCNC: 153 IU/L (ref 40–129)
ALT SERPL-CCNC: 15 U/L (ref 10–40)
AST SERPL-CCNC: 19 IU/L (ref 15–37)
BILIRUB SERPL-MCNC: 0.9 MG/DL (ref 0–1)
BILIRUBIN DIRECT: 0.2 MG/DL (ref 0–0.3)
BILIRUBIN, INDIRECT: 0.7 MG/DL (ref 0–0.7)
TOTAL PROTEIN: 6.8 GM/DL (ref 6.4–8.2)
TSH HIGH SENSITIVITY: 2.18 UIU/ML (ref 0.27–4.2)
VITAMIN D 25-HYDROXY: 22.33 NG/ML

## 2018-08-16 LAB
RETINYL PALMITATE: <0.02
VITAMIN A LEVEL: 0.5
VITAMIN A, INTERP: NORMAL
VITAMIN E LEVEL: 2
VITAMIN E LEVEL: 8.2

## 2018-12-19 ENCOUNTER — HOSPITAL ENCOUNTER (OUTPATIENT)
Dept: WOMENS IMAGING | Age: 63
Discharge: HOME | End: 2018-12-19
Payer: COMMERCIAL

## 2018-12-19 DIAGNOSIS — Z12.31 ENCOUNTER FOR SCREENING MAMMOGRAM FOR BREAST CANCER: ICD-10-CM

## 2018-12-19 PROCEDURE — 77067 SCR MAMMO BI INCL CAD: CPT

## 2019-12-23 ENCOUNTER — HOSPITAL ENCOUNTER (OUTPATIENT)
Dept: WOMENS IMAGING | Age: 64
Discharge: HOME | End: 2019-12-23
Payer: MEDICARE

## 2019-12-23 DIAGNOSIS — Z12.31 BREAST CANCER SCREENING BY MAMMOGRAM: ICD-10-CM

## 2019-12-23 PROCEDURE — 77067 SCR MAMMO BI INCL CAD: CPT

## 2020-01-21 ENCOUNTER — TELEPHONE (OUTPATIENT)
Dept: CARDIOLOGY CLINIC | Age: 65
End: 2020-01-21

## 2020-03-05 ENCOUNTER — TELEPHONE (OUTPATIENT)
Dept: CARDIOLOGY CLINIC | Age: 65
End: 2020-03-05

## 2020-07-04 ENCOUNTER — HOSPITAL ENCOUNTER (EMERGENCY)
Age: 65
Discharge: HOME | End: 2020-07-04
Attending: EMERGENCY MEDICINE
Payer: MEDICARE

## 2020-07-04 VITALS
WEIGHT: 190 LBS | HEART RATE: 78 BPM | BODY MASS INDEX: 33.66 KG/M2 | HEIGHT: 63 IN | OXYGEN SATURATION: 96 % | SYSTOLIC BLOOD PRESSURE: 153 MMHG | DIASTOLIC BLOOD PRESSURE: 87 MMHG | TEMPERATURE: 97.8 F | RESPIRATION RATE: 16 BRPM

## 2020-07-04 PROCEDURE — 99282 EMERGENCY DEPT VISIT SF MDM: CPT

## 2020-07-04 NOTE — ED NOTES
Pt reports rash to left upper chest and some to left ribs. Reports thought started as a bug bite.      Janel Lopez RN  07/04/20 [id number]

## 2020-07-04 NOTE — ED PROVIDER NOTES
eMERGENCY dEPARTMENT eNCOUnter      PCP: Charlotte Quevedo MD    [facility]    Chief Complaint   Patient presents with    Rash     to left chest/ ribs       HPI    Kalyani Manley is a 76 y.o. female who presents with a rash since the onset 2 days. The duration has been constant since the onset. The quality rash is that it is pruritic. The location is left anterior shoulder, left lateral breast.  Patient has tried lemon oil for relief of symptoms. No known aggravating or alleviating factors. No new product use, contact with plants, new foods, new medications. It is not painful or burning. Thought maybe she was bite by something. Has been sitting outside on her porch. REVIEW OF SYSTEMS    General: Denies fevers or syncope  ENT: Denies throat swelling or tongue swelling  Pulmonary: Denies wheezes, difficulty breathing,  or chest tightness  Skin: See HPI    All other review of systems are negative  See HPI and nursing notes for additional information     PAST MEDICAL & SURGICAL HISTORY    Past Medical History:   Diagnosis Date    Bilateral arm weakness 11/15/2016    Chest pain 11/15/2016    Chronic kidney disease     Family history of coronary artery disease     Father-CAD & CABG.     Gallstones     \"have gallstones, dx 2004\"    GERD (gastroesophageal reflux disease)     History of Holter monitoring 12/08/2017    Hypertension 11/2017    Jaundice     \"5500 when I had mono I was jaundice\"    Lupus ([address]     \"was told years ago that I had lupus then two years later they said had linda barr virus and fibromyalgia , not lupus so I dont know if I have it or not\"    Migraines     Nausea & vomiting     post op n&v, hx motion sickness    Other disorders of kidney and ureter in diseases classified elsewhere     Peripheral polyneuropathy 5/23/2017    Primary biliary cirrhosis ([address]     Prolonged emergence from general anesthesia     Vertigo      Past Surgical History:   Procedure Laterality Date    ABDOMEN SURGERY      APPENDECTOMY      BREAST SURGERY      left breast bx     BREAST SURGERY Left     cyst removal     SECTION      \"and also tied my tubes\"    CHOLECYSTECTOMY      COLONOSCOPY  16    Internal hemorrhoids    DILATION AND CURETTAGE OF UTERUS      as a teenager    ENDOSCOPY, COLON, DIAGNOSTIC  16    mild gastritis, hiatal hernia, balloon dilitation up to 20mm    HYSTERECTOMY  1986    nichelle\"left both ovaries\"       CURRENT MEDICATIONS    Current Outpatient Rx   Medication Sig Dispense Refill    dicyclomine (BENTYL) 10 MG capsule Take 10 mg by mouth 4 times daily (before meals and nightly)      ursodiol (ACTIGALL) 300 MG capsule TK 2 CS PO BID  5    lisinopril (PRINIVIL;ZESTRIL) 10 MG tablet Take 1 tablet by mouth daily 30 tablet 3    Cholecalciferol (VITAMIN D3) 00613 units CAPS Take 50,000 Units by mouth once a week 11/15/17 Patient states she takes this on       ondansetron (ZOFRAN) 4 MG tablet Take 1 tablet by mouth daily as needed for Nausea or Vomiting 60 tablet 2    SUMAtriptan (IMITREX) 50 MG tablet Take 50 mg by mouth      ranitidine (ZANTAC) 300 MG tablet Take 1 tablet by mouth nightly 30 tablet 3    meclizine (ANTIVERT) 12.5 MG tablet Take 25 mg by mouth 3 times daily as needed.          ALLERGIES    Allergies   Allergen Reactions    Antihistamines, Diphenhydramine-Type     Codeine Swelling     \"lips go numb and eyes and lips swell\"    Lyrica [Pregabalin]     Promethazine        SOCIAL & FAMILY HISTORY    Social History     Socioeconomic History    Marital status:      Spouse name: None    Number of children: None    Years of education: None    Highest education level: None   Occupational History    None   Social Needs    Financial resource strain: None    Food insecurity     Worry: None     Inability: None    Transportation needs     Medical: None     Non-medical: None   Tobacco Use    Smoking status: Former Smoker     Packs/day: 1.00     Years: 6.00     Pack years: 6.00     Last attempt to quit: 1978     Years since quittin.2    Smokeless tobacco: Never Used   Substance and Sexual Activity    Alcohol use: No    Drug use: No    Sexual activity: None   Lifestyle    Physical activity     Days per week: None     Minutes per session: None    Stress: None   Relationships    Social connections     Talks on phone: None     Gets together: None     Attends Gnosticism service: None     Active member of club or organization: None     Attends meetings of clubs or organizations: None     Relationship status: None    Intimate partner violence     Fear of current or ex partner: None     Emotionally abused: None     Physically abused: None     Forced sexual activity: None   Other Topics Concern    None   Social History Narrative    Sleep:  Hours of sleep 6-7      Bed Time 10 PM      Wake Time 5-6 AM      Number of times waking 3      Reason: uncomfortable, restroom      Restful sleep No      Sleep Aids No Type:       Snoring No - has sleep eval - negative        Diet:  Vegetables Yes Servings per day 2     Fruit  Yes  Servings per day 2     Dairy  Yes  Servings per day 0-1     Protein Yes  Servings per day 1-2  Source: chicken     Nuts No   Servings per week      Legumes occasionally  Servings per day      Processed foods Yes Servings per week 7           Water Yes  Glasses per day 6-10     Caffeine rarely  Servings per day   Type          Exercise: Regular Yes       Type yoga         Duration 15-20       Frequency daily          Family History   Problem Relation Age of Onset    Heart Disease Father         cabg    Stroke Father     Cancer Father     Kidney Disease Father     High Blood Pressure Mother     Stroke Mother     Asthma Mother     COPD Mother     Depression Mother     Other Mother         Suicide    Migraines Mother     Thyroid Disease Mother     Cancer Paternal Aunt         colon    Cancer Other         Grandfather    Migraines Sister     Other Other         Gout-Grandmother       PHYSICAL EXAM    VITAL SIGNS: BP (!) 153/87   Pulse 78   Temp 97.8 °F (36.6 °C) (Oral)   Resp 16   Ht 5' 3\" (1.6 m)   Wt 190 lb (86.2 kg)   SpO2 96%   BMI 33.66 kg/m²   Constitutional:  Well developed, well nourished  HENT:  Atraumatic, no facial or lip swelling  ORAL EXAM:  No tongue swelling, airway patent/Throat is clear  Neck/Lymphatics: supple, no swollen nodes  Respiratory:  Nonlabored breathing. Cardiovascular:  No JVD   Musculoskeletal:  No edema, no acute deformities. Integument:  Erythematous papules and crusted lesions located on the anterior left shoulder, a few scattered on the lateral aspect of the left breast and 1 located on the upper abdomen just to the right of the midline. ED COURSE & MEDICAL DECISION MAKING       Vital signs and nursing notes reviewed during ED course. I have independently evaluated this patient . All pertinent Lab data and radiographic results reviewed with patient at bedside. The patient and/or the family were informed of the results of any tests/labs/imaging, the treatment plan, and time was allotted to answer questions. 72yo female presenting with rash. Not in a consistent dermatomal pattern and 1 lesion across the midline on the abdomen. May be a plant dermatitis or other dermatitis. Will started steroid cream and observe for changes. She states she cannot take oral steroids. She is instructed to follow up on Monday with PCP. Differential diagnosis: Vasculitis, bacterial skin infection, viral rash, systemic infectious rash, anaphylaxis, urticaria, exposure to poison ivy or poison oak or other sources of contact dermatitis, bacterial skin infection , viral rash, systemic infectious rash, Anaphylaxis, Urticaria, necrotizing fascitis, other    The likelihood of other entities in the differential is insufficient to justify any further testing for them. This was explained to the patient. The patient was advised that persistent or worsening symptoms would require further evaluation. Clinical  IMPRESSION    Dermatitis       Diagnosis and plan discussed in detail with patient who understands and agrees. Return to emergency Department warning signs discussed in detail with patient today who understands and agrees.       (Please note the MDM and HPI sections of this note were completed with a voice recognition program.  Efforts were made to edit the dictations but occasionally words are mis-transcribed.)      Ana Florentino, DO  07/04/20 [id number]

## 2021-12-17 ENCOUNTER — HOSPITAL ENCOUNTER (OUTPATIENT)
Dept: INFUSION THERAPY | Age: 66
Setting detail: INFUSION SERIES
Discharge: HOME | End: 2021-12-17
Payer: MEDICARE

## 2021-12-17 VITALS
DIASTOLIC BLOOD PRESSURE: 68 MMHG | RESPIRATION RATE: 20 BRPM | TEMPERATURE: 97.1 F | SYSTOLIC BLOOD PRESSURE: 145 MMHG | HEART RATE: 82 BPM | OXYGEN SATURATION: 96 %

## 2021-12-17 PROCEDURE — 6360000002 HC RX W HCPCS: Performed by: NURSE PRACTITIONER

## 2021-12-17 PROCEDURE — M0245 HC IV INFUSION BAMLANIVIMAB & ETESEVIMAB W/MONITORING: HCPCS

## 2021-12-17 PROCEDURE — 2580000003 HC RX 258: Performed by: NURSE PRACTITIONER

## 2021-12-17 PROCEDURE — 2500000003 HC RX 250 WO HCPCS: Performed by: NURSE PRACTITIONER

## 2021-12-17 RX ORDER — SODIUM CHLORIDE 0.9 % (FLUSH) 0.9 %
5-40 SYRINGE (ML) INJECTION PRN
Status: DISCONTINUED | OUTPATIENT
Start: 2021-12-17 | End: 2021-12-18 | Stop reason: HOSPADM

## 2021-12-17 RX ADMIN — SODIUM CHLORIDE: 9 INJECTION, SOLUTION INTRAVENOUS at 09:10

## 2022-02-24 ENCOUNTER — HOSPITAL ENCOUNTER (OUTPATIENT)
Dept: WOMENS IMAGING | Age: 67
Discharge: HOME | End: 2022-02-24
Payer: MEDICARE

## 2022-02-24 DIAGNOSIS — Z12.31 ENCOUNTER FOR SCREENING MAMMOGRAM FOR MALIGNANT NEOPLASM OF BREAST: ICD-10-CM

## 2022-02-24 PROCEDURE — 77063 BREAST TOMOSYNTHESIS BI: CPT

## 2022-02-28 ENCOUNTER — APPOINTMENT (OUTPATIENT)
Dept: CT IMAGING | Age: 67
End: 2022-02-28
Payer: MEDICARE

## 2022-02-28 ENCOUNTER — HOSPITAL ENCOUNTER (EMERGENCY)
Age: 67
Discharge: HOME | End: 2022-02-28
Attending: EMERGENCY MEDICINE
Payer: MEDICARE

## 2022-02-28 VITALS
WEIGHT: 187 LBS | HEART RATE: 75 BPM | OXYGEN SATURATION: 100 % | SYSTOLIC BLOOD PRESSURE: 146 MMHG | DIASTOLIC BLOOD PRESSURE: 66 MMHG | RESPIRATION RATE: 16 BRPM | HEIGHT: 63 IN | TEMPERATURE: 97.9 F | BODY MASS INDEX: 33.13 KG/M2

## 2022-02-28 DIAGNOSIS — R11.0 NAUSEA: Primary | ICD-10-CM

## 2022-02-28 DIAGNOSIS — I10 ESSENTIAL HYPERTENSION: ICD-10-CM

## 2022-02-28 DIAGNOSIS — N30.00 ACUTE CYSTITIS WITHOUT HEMATURIA: ICD-10-CM

## 2022-02-28 LAB
ALBUMIN SERPL-MCNC: 3.7 GM/DL (ref 3.4–5)
ALP BLD-CCNC: 181 IU/L (ref 40–129)
ALT SERPL-CCNC: 21 U/L (ref 10–40)
ANION GAP SERPL CALCULATED.3IONS-SCNC: 12 MMOL/L (ref 4–16)
AST SERPL-CCNC: 23 IU/L (ref 15–37)
BASOPHILS ABSOLUTE: 0.1 K/CU MM
BASOPHILS RELATIVE PERCENT: 0.7 % (ref 0–1)
BILIRUB SERPL-MCNC: 1 MG/DL (ref 0–1)
BUN BLDV-MCNC: 11 MG/DL (ref 6–23)
CALCIUM SERPL-MCNC: 9.1 MG/DL (ref 8.3–10.6)
CHLORIDE BLD-SCNC: 107 MMOL/L (ref 99–110)
CO2: 24 MMOL/L (ref 21–32)
CREAT SERPL-MCNC: 0.7 MG/DL (ref 0.6–1.1)
DIFFERENTIAL TYPE: ABNORMAL
EOSINOPHILS ABSOLUTE: 0 K/CU MM
EOSINOPHILS RELATIVE PERCENT: 0.4 % (ref 0–3)
GFR AFRICAN AMERICAN: >60 ML/MIN/1.73M2
GFR NON-AFRICAN AMERICAN: >60 ML/MIN/1.73M2
GLUCOSE BLD-MCNC: 104 MG/DL (ref 70–99)
HCT VFR BLD CALC: 43.3 % (ref 37–47)
HEMOGLOBIN: 13.9 GM/DL (ref 12.5–16)
IMMATURE NEUTROPHIL %: 0.2 % (ref 0–0.43)
LIPASE: 24 IU/L (ref 13–60)
LYMPHOCYTES ABSOLUTE: 1.5 K/CU MM
LYMPHOCYTES RELATIVE PERCENT: 15.5 % (ref 24–44)
MCH RBC QN AUTO: 28.9 PG (ref 27–31)
MCHC RBC AUTO-ENTMCNC: 32.1 % (ref 32–36)
MCV RBC AUTO: 90 FL (ref 78–100)
MONOCYTES ABSOLUTE: 0.3 K/CU MM
MONOCYTES RELATIVE PERCENT: 3.5 % (ref 0–4)
NUCLEATED RBC %: 0 %
PDW BLD-RTO: 12.7 % (ref 11.7–14.9)
PLATELET # BLD: 300 K/CU MM (ref 140–440)
PMV BLD AUTO: 10.2 FL (ref 7.5–11.1)
POTASSIUM SERPL-SCNC: 4 MMOL/L (ref 3.5–5.1)
RBC # BLD: 4.81 M/CU MM (ref 4.2–5.4)
SEGMENTED NEUTROPHILS ABSOLUTE COUNT: 7.6 K/CU MM
SEGMENTED NEUTROPHILS RELATIVE PERCENT: 79.7 % (ref 36–66)
SODIUM BLD-SCNC: 143 MMOL/L (ref 135–145)
TOTAL IMMATURE NEUTOROPHIL: 0.02 K/CU MM
TOTAL NUCLEATED RBC: 0 K/CU MM
TOTAL PROTEIN: 6.7 GM/DL (ref 6.4–8.2)
WBC # BLD: 9.5 K/CU MM (ref 4–10.5)

## 2022-02-28 PROCEDURE — 6360000004 HC RX CONTRAST MEDICATION: Performed by: EMERGENCY MEDICINE

## 2022-02-28 PROCEDURE — 74177 CT ABD & PELVIS W/CONTRAST: CPT

## 2022-02-28 PROCEDURE — 99285 EMERGENCY DEPT VISIT HI MDM: CPT

## 2022-02-28 PROCEDURE — 2580000003 HC RX 258: Performed by: EMERGENCY MEDICINE

## 2022-02-28 PROCEDURE — 80053 COMPREHEN METABOLIC PANEL: CPT

## 2022-02-28 PROCEDURE — 83690 ASSAY OF LIPASE: CPT

## 2022-02-28 PROCEDURE — 85025 COMPLETE CBC W/AUTO DIFF WBC: CPT

## 2022-02-28 RX ORDER — METOCLOPRAMIDE HYDROCHLORIDE 5 MG/ML
10 INJECTION INTRAMUSCULAR; INTRAVENOUS ONCE
Status: DISCONTINUED | OUTPATIENT
Start: 2022-02-28 | End: 2022-02-28

## 2022-02-28 RX ORDER — ONDANSETRON 2 MG/ML
4 INJECTION INTRAMUSCULAR; INTRAVENOUS ONCE
Status: DISCONTINUED | OUTPATIENT
Start: 2022-02-28 | End: 2022-02-28 | Stop reason: HOSPADM

## 2022-02-28 RX ORDER — 0.9 % SODIUM CHLORIDE 0.9 %
1000 INTRAVENOUS SOLUTION INTRAVENOUS ONCE
Status: COMPLETED | OUTPATIENT
Start: 2022-02-28 | End: 2022-02-28

## 2022-02-28 RX ADMIN — IOPAMIDOL 80 ML: 755 INJECTION, SOLUTION INTRAVENOUS at 13:18

## 2022-02-28 RX ADMIN — SODIUM CHLORIDE 1000 ML: 9 INJECTION, SOLUTION INTRAVENOUS at 11:55

## 2022-02-28 NOTE — ED PROVIDER NOTES
Triage Chief Complaint:   No chief complaint on file. [place]:  Rosalie Santos is a 78 y.o. female that presents with nausea and vomiting since of recently diagnosed urinary tract infection. Patient reports last 2 days she has had some pain with urinating and sensation that she needs to go and she does not. Patient was seen in urgent care and started on antibiotic for urinary tract infection. Patient was reports \"it starts with an M\". Today patient developed nausea and vomiting which prompted call the EMS. Patient denies any abdominal pain but does describe feeling \"queasy\". No flank pains. Patient has had subjective fevers and chills at home. Patient does not routinely get sick like this. Patient did trial some Zofran prior to arrival with limited improvement. ROS:  General:  + fevers, + chills, no weakness  Eyes:  No recent vison changes, no discharge  ENT:  No sore throat, no nasal congestion, no hearing changes  Cardiovascular:  No chest pain, no palpitations  Respiratory:  No shortness of breath, no cough, no wheezing  Gastrointestinal:  No pain, + nausea, + vomiting, no diarrhea  Musculoskeletal:  No muscle pain, no joint pain  Skin:  No rash, no pruritis, no easy bruising  Neurologic:  No speech problems, no headache, no extremity numbness, no extremity tingling, no extremity weakness  Psychiatric:  No anxiety  Genitourinary:  + dysuria, no hematuria, + urgency  Endocrine:  No unexpected weight gain, no unexpected weight loss  Extremities:  no edema, no pain    Past Medical History:   Diagnosis Date    Bilateral arm weakness 11/15/2016    Chest pain 11/15/2016    Chronic kidney disease     Family history of coronary artery disease     Father-CAD & CABG.     Gallstones     \"have gallstones, dx 2004\"    GERD (gastroesophageal reflux disease)     History of Holter monitoring 12/08/2017    Hypertension 11/2017    Jaundice     \"0114 when I had mono I was jaundice\"    Lupus ([address]     \"was told years ago that I had lupus then two years later they said had linda barr virus and fibromyalgia , not lupus so I dont know if I have it or not\"    Migraines     Nausea & vomiting     post op n&v, hx motion sickness    Other disorders of kidney and ureter in diseases classified elsewhere     Peripheral polyneuropathy 2017    [facility] cirrhosis [facility]     Prolonged emergence from general anesthesia     Vertigo      Past Surgical History:   Procedure Laterality Date    ABDOMEN SURGERY      APPENDECTOMY      BREAST SURGERY      left breast bx     BREAST SURGERY Left     cyst removal     SECTION      \"and also tied my tubes\"    CHOLECYSTECTOMY      COLONOSCOPY  16    Internal hemorrhoids    DILATION AND CURETTAGE OF UTERUS      as a teenager    ENDOSCOPY, COLON, DIAGNOSTIC  16    mild gastritis, hiatal hernia, balloon dilitation up to 20mm    HYSTERECTOMY  1986    nichelle\"left both ovaries\"     Family History   Problem Relation Age of Onset    Heart Disease Father         cabg    Stroke Father     Cancer Father     Kidney Disease Father     High Blood Pressure Mother     Stroke Mother     Asthma Mother     COPD Mother     Depression Mother     Other Mother         Suicide    Migraines Mother     Thyroid Disease Mother     Cancer Paternal Aunt         colon    Breast Cancer Paternal Aunt     Cancer Other         Grandfather   Dubon Migraines Sister     Other Other         Gout-Grandmother    Breast Cancer Paternal Cousin      Social History     Socioeconomic History    Marital status:      Spouse name: Not on file    Number of children: Not on file    Years of education: Not on file    Highest education level: Not on file   Occupational History    Not on file   Tobacco Use    Smoking status: Former Smoker     Packs/day: 1.00     Years: 6.00     Pack years: 6.00     Quit date: 1978     Years since quittin.8    Smokeless tobacco: Never Used   Substance and Sexual Activity    Alcohol use: No    Drug use: No    Sexual activity: Not on file   Other Topics Concern    Not on file   Social History Narrative    Sleep:  Hours of sleep 6-7      Bed Time 10 PM      Wake Time 5-6 AM      Number of times waking 3      Reason: uncomfortable, restroom      Restful sleep No      Sleep Aids No Type:       Snoring No - has sleep eval - negative        Diet:  Vegetables Yes Servings per day 2     Fruit  Yes  Servings per day 2     Dairy  Yes  Servings per day 0-1     Protein Yes  Servings per day 1-2  Source: chicken     Nuts No   Servings per week      Legumes occasionally  Servings per day      Processed foods Yes Servings per week 7           Water Yes  Glasses per day 6-10     Caffeine rarely  Servings per day   Type          Exercise: Regular Yes       Type yoga         Duration 15-20       Frequency daily          Social Determinants of Health     Financial Resource Strain:     Difficulty of Paying Living Expenses: Not on file   Food Insecurity:     Worried About Running Out of Food in the Last Year: Not on file    [place]kin of Food in the Last Year: Not on file   Transportation Needs:     Lack of Transportation (Medical): Not on file    Lack of Transportation (Non-Medical):  Not on file   Physical Activity:     Days of Exercise per Week: Not on file    Minutes of Exercise per Session: Not on file   Stress:     Feeling of Stress : Not on file   Social Connections:     Frequency of Communication with Friends and Family: Not on file    Frequency of Social Gatherings with Friends and Family: Not on file    Attends Holiness Services: Not on file    Active Member of Clubs or Organizations: Not on file    Attends Club or Organization Meetings: Not on file    Marital Status: Not on file   Intimate Partner Violence:     Fear of Current or Ex-Partner: Not on file    Emotionally Abused: Not on file    Physically Abused: Not on file   Ling Sexually Abused: Not on file   Housing Stability:     Unable to Pay for Housing in the Last Year: Not on file    Number of Places Lived in the Last Year: Not on file    Unstable Housing in the Last Year: Not on file     Current Facility-Administered Medications   Medication Dose Route Frequency Provider Last Rate Last Admin    ondansetron (ZOFRAN) injection 4 mg  4 mg IntraVENous Once Candi De León MD         Current Outpatient Medications   Medication Sig Dispense Refill    dicyclomine (BENTYL) 10 MG capsule Take 10 mg by mouth 4 times daily (before meals and nightly)      ursodiol (ACTIGALL) 300 MG capsule TK 2 CS PO BID  5    lisinopril (PRINIVIL;ZESTRIL) 10 MG tablet Take 1 tablet by mouth daily 30 tablet 3    Cholecalciferol (VITAMIN D3) 49361 units CAPS Take 50,000 Units by mouth once a week 11/15/17 Patient states she takes this on Saturdays      ondansetron (ZOFRAN) 4 MG tablet Take 1 tablet by mouth daily as needed for Nausea or Vomiting 60 tablet 2    SUMAtriptan (IMITREX) 50 MG tablet Take 50 mg by mouth      ranitidine (ZANTAC) 300 MG tablet Take 1 tablet by mouth nightly 30 tablet 3    meclizine (ANTIVERT) 12.5 MG tablet Take 25 mg by mouth 3 times daily as needed. Allergies   Allergen Reactions    Antihistamines, Diphenhydramine-Type     Codeine Swelling     \"lips go numb and eyes and lips swell\"    Lyrica [Pregabalin]     Promethazine        Nursing Notes Reviewed    Physical Exam:  ED Triage Vitals   Enc Vitals Group      BP 02/28/22 0905 (!) 162/75      Pulse 02/28/22 0904 80      Resp 02/28/22 0904 18      Temp 02/28/22 0904 97.9 °F (36.6 °C)      Temp Source 02/28/22 0904 Oral      SpO2 02/28/22 0904 97 %      Weight 02/28/22 0904 187 lb (84.8 kg)      Height 02/28/22 0904 5' 3\" (1.6 m)      Head Circumference --       Peak Flow --       Pain Score --       Pain Loc --       Pain Edu? --       Excl.  in [address]? --        My pulse ox interpretation is - normal    General appearance: No acute distress. Appears not to be feeling well but overall nontoxic. Skin:  Warm. Dry. No diaphoresis. Eye:  Extraocular movements intact. Ears, nose, mouth and throat:  Oral mucosa moist   Neck:  Trachea midline. Extremity:  No swelling. Normal ROM     Heart:  Regular rate and rhythm, normal S1 & S2, no extra heart sounds. Perfusion:  Intact   Respiratory:  Lungs clear to auscultation bilaterally. Respirations nonlabored. Speaking clearly in full sentences. Abdominal:  Normal bowel sounds. Soft. Completely nontender. Elevated BMI. Non distended. Back:  No CVA tenderness to palpation     Neurological:  Alert and oriented times 3. No focal neuro deficits.              Psychiatric:  Appropriate    I have reviewed and interpreted all of the currently available lab results from this visit (if applicable):  Results for orders placed or performed during the hospital encounter of 02/28/22   CBC with Auto Differential   Result Value Ref Range    WBC 9.5 4.0 - 10.5 K/CU MM    RBC 4.81 4.2 - 5.4 M/CU MM    Hemoglobin 13.9 12.5 - 16.0 GM/DL    Hematocrit 43.3 37 - 47 %    MCV 90.0 78 - 100 FL    MCH 28.9 27 - 31 PG    MCHC 32.1 32.0 - 36.0 %    RDW 12.7 11.7 - 14.9 %    Platelets 881 181 - 369 K/CU MM    MPV 10.2 7.5 - 11.1 FL    Differential Type AUTOMATED DIFFERENTIAL     Segs Relative 79.7 (H) 36 - 66 %    Lymphocytes % 15.5 (L) 24 - 44 %    Monocytes % 3.5 0 - 4 %    Eosinophils % 0.4 0 - 3 %    Basophils % 0.7 0 - 1 %    Segs Absolute 7.6 K/CU MM    Lymphocytes Absolute 1.5 K/CU MM    Monocytes Absolute 0.3 K/CU MM    Eosinophils Absolute 0.0 K/CU MM    Basophils Absolute 0.1 K/CU MM    Nucleated RBC % 0.0 %    Total Nucleated RBC 0.0 K/CU MM    Total Immature Neutrophil 0.02 K/CU MM    Immature Neutrophil % 0.2 0 - 0.43 %   Comprehensive Metabolic Panel   Result Value Ref Range    Sodium 143 135 - 145 MMOL/L    Potassium 4.0 3.5 - 5.1 MMOL/L    Chloride 107 99 - 110 mMol/L    CO2 24 21 - 32 MMOL/L    BUN 11 6 - 23 MG/DL    CREATININE 0.7 0.6 - 1.1 MG/DL    Glucose 104 (H) 70 - 99 MG/DL    Calcium 9.1 8.3 - 10.6 MG/DL    Albumin 3.7 3.4 - 5.0 GM/DL    Total Protein 6.7 6.4 - 8.2 GM/DL    Total Bilirubin 1.0 0.0 - 1.0 MG/DL    ALT 21 10 - 40 U/L    AST 23 15 - 37 IU/L    Alkaline Phosphatase 181 (H) 40 - 129 IU/L    GFR Non-African American >60 >60 mL/min/1.73m2    GFR African American >60 >60 mL/min/1.73m2    Anion Gap 12 4 - 16   Lipase   Result Value Ref Range    Lipase 24 13 - 60 IU/L      Radiographs (if obtained):  [] The following radiograph was interpreted by myself in the absence of a radiologist:   [x] Radiologist's Report Reviewed:  CT ABDOMEN PELVIS W IV CONTRAST Additional Contrast? None   Final Result   No acute process in the abdomen or pelvis. EKG (if obtained): (All EKG's are interpreted by myself in the absence of a cardiologist)    Chart review shows recent radiographs:  [facility] RAINA DIGITAL SCREEN BILATERAL    Result Date: 2/24/2022  EXAMINATION: SCREENING DIGITAL BILATERAL  MAMMOGRAM WITH TOMOSYNTHESIS 2/24/2022 TECHNIQUE: Screening mammography was performed with tomosynthesis including MLO and CC views of the bilateral breasts. Computer aided detection was used for the interpretation of this exam. COMPARISON: December 23, 2019 and December 19, 2018 HISTORY: Screening. FINDINGS: The breasts are heterogeneously dense, which may obscure small masses. Within that limitation, there is no dominant mass, suspicious microcalcification, or area of architectural distortion. No mammographic evidence of malignancy. BIRADS: BIRADS - CATEGORY 1 Negative, no evidence of malignancy. Normal interval follow-up is recommended in 12 months. OVERALL ASSESSMENT - NEGATIVE A letter of notification will be sent to the patient regarding the results. The [address] [place] [address] St of Radiology recommends annual mammograms for women 40 years and older. MDM:  Pt presents as above.   Emergent conditions considered. Presentation prompted initial immediate evaluation. IV fluids, IV Zofran ordered. Labs and imaging are ordered. CBC and CMP without clinically significant derangement. Lipase is not suggestive of pancreatitis. Patient actually declined the Zofran here as she was no longer nauseous; suspect her medication taken prior to arrival kicked in. Patient is without any vomiting throughout prolonged ED course. Patient continues to appear well on recheck. Abdomen remains benign. I do believe patient is appropriate for the outpatient follow-up. Patient is marginally hypertensive here in the emergency department and will need recheck as an outpatient when she is feeling improved. Encourage patient to call her PCP today to arrange for this. Patient has enough Zofran at home and does not require any refill. I discussed specific signs and symptoms on when to return to the emergency department as well as the need for close outpatient follow-up. Questions sought and answered with the patient. They voice understanding and agree with plan. Care of this patient occurred during the COVID-19 pandemic. Clinical Impression:  1. Nausea    2. Acute cystitis without hematuria    3. Essential hypertension      Disposition referral (if applicable):  Irving Veloz MD  [address] [place] [address]   [phone]    Schedule an appointment as soon as possible for a visit   For a blood pressure recheck and recheck of symptoms later this week.     [address] [place] [address] Emergency Department  [place] [id number] [phone]  Today  If symptoms worsen    Disposition medications (if applicable):  New Prescriptions    No medications on file       Comment: Please note this report has been produced using speech recognition software and may contain errors related to that system including errors in grammar, punctuation, and spelling, as well as words and phrases that may be inappropriate. If there are any questions or concerns please feel free to contact the dictating provider for clarification.        Mike Haider MD  02/28/22 [id number]